# Patient Record
Sex: FEMALE | URBAN - METROPOLITAN AREA
[De-identification: names, ages, dates, MRNs, and addresses within clinical notes are randomized per-mention and may not be internally consistent; named-entity substitution may affect disease eponyms.]

---

## 2019-09-23 ENCOUNTER — APPOINTMENT (OUTPATIENT)
Age: 59
Setting detail: DERMATOLOGY
End: 2019-09-23

## 2019-09-23 DIAGNOSIS — L20.89 OTHER ATOPIC DERMATITIS: ICD-10-CM

## 2019-09-23 PROBLEM — L20.84 INTRINSIC (ALLERGIC) ECZEMA: Status: ACTIVE | Noted: 2019-09-23

## 2019-09-23 PROCEDURE — OTHER TREATMENT REGIMEN: OTHER

## 2019-09-23 PROCEDURE — OTHER PRESCRIPTION: OTHER

## 2019-09-23 PROCEDURE — OTHER MIPS QUALITY: OTHER

## 2019-09-23 PROCEDURE — 99202 OFFICE O/P NEW SF 15 MIN: CPT

## 2019-09-23 PROCEDURE — OTHER COUNSELING: OTHER

## 2019-09-23 RX ORDER — CLOBETASOL PROPIONATE 0.5 MG/G
OINTMENT TOPICAL
Qty: 1 | Refills: 3 | Status: ERX | COMMUNITY
Start: 2019-09-23

## 2019-09-23 RX ORDER — CALCIPOTRIENE 50 UG/G
OINTMENT TOPICAL
Qty: 1 | Refills: 2 | Status: ERX | COMMUNITY
Start: 2019-09-23

## 2019-09-23 NOTE — PROCEDURE: MIPS QUALITY
Name And Contact Information For Health Care Proxy: Ernesto Hernandesuma, [phone] Name And Contact Information For Health Care Proxy: Ernesto Hernandesuma, [phone]

## 2019-09-23 NOTE — PROCEDURE: TREATMENT REGIMEN
Otc Regimen: Use good moisturizer such a vanicream, cerave, cetaphil after washing hands and drying off with a towel instead of paper products
Detail Level: Zone
Plan: Use creams as directed and at night wear cotton gloves over top of the medication on hands

## 2019-11-04 ENCOUNTER — APPOINTMENT (OUTPATIENT)
Age: 59
Setting detail: DERMATOLOGY
End: 2019-11-05

## 2019-11-04 DIAGNOSIS — L20.89 OTHER ATOPIC DERMATITIS: ICD-10-CM

## 2019-11-04 PROBLEM — L20.84 INTRINSIC (ALLERGIC) ECZEMA: Status: ACTIVE | Noted: 2019-11-04

## 2019-11-04 PROCEDURE — OTHER PRESCRIPTION: OTHER

## 2019-11-04 PROCEDURE — OTHER MIPS QUALITY: OTHER

## 2019-11-04 PROCEDURE — OTHER TREATMENT REGIMEN: OTHER

## 2019-11-04 PROCEDURE — 99213 OFFICE O/P EST LOW 20 MIN: CPT

## 2019-11-04 PROCEDURE — OTHER COUNSELING: OTHER

## 2019-11-04 RX ORDER — TACROLIMUS 1 MG/G
OINTMENT TOPICAL
Qty: 1 | Refills: 3 | Status: ERX | COMMUNITY
Start: 2019-11-04

## 2019-11-04 ASSESSMENT — LOCATION ZONE DERM: LOCATION ZONE: HAND

## 2019-11-04 ASSESSMENT — LOCATION SIMPLE DESCRIPTION DERM
LOCATION SIMPLE: RIGHT HAND
LOCATION SIMPLE: LEFT HAND

## 2019-11-04 ASSESSMENT — LOCATION DETAILED DESCRIPTION DERM
LOCATION DETAILED: RIGHT ULNAR PALM
LOCATION DETAILED: LEFT RADIAL PALM

## 2019-11-04 NOTE — PROCEDURE: TREATMENT REGIMEN
Detail Level: Detailed
Plan: D/c calcipotriene. Start protopic and mix with clobetasol. Bare 40 to help absorb meds

## 2019-11-04 NOTE — PROCEDURE: MIPS QUALITY
Name And Contact Information For Health Care Proxy: Ernesto Hernandesuma, [phone] Name And Contact Information For Health Care Proxy: Ernesto Hernandesuma, [phone]